# Patient Record
Sex: MALE | Race: WHITE | NOT HISPANIC OR LATINO
[De-identification: names, ages, dates, MRNs, and addresses within clinical notes are randomized per-mention and may not be internally consistent; named-entity substitution may affect disease eponyms.]

---

## 2020-03-30 PROBLEM — Z00.00 ENCOUNTER FOR PREVENTIVE HEALTH EXAMINATION: Status: ACTIVE | Noted: 2020-03-30

## 2020-03-31 ENCOUNTER — RECORD ABSTRACTING (OUTPATIENT)
Age: 75
End: 2020-03-31

## 2020-03-31 DIAGNOSIS — Z72.89 OTHER PROBLEMS RELATED TO LIFESTYLE: ICD-10-CM

## 2020-03-31 DIAGNOSIS — K62.5 HEMORRHAGE OF ANUS AND RECTUM: ICD-10-CM

## 2020-03-31 DIAGNOSIS — Z82.49 FAMILY HISTORY OF ISCHEMIC HEART DISEASE AND OTHER DISEASES OF THE CIRCULATORY SYSTEM: ICD-10-CM

## 2020-03-31 DIAGNOSIS — Z87.39 PERSONAL HISTORY OF OTHER DISEASES OF THE MUSCULOSKELETAL SYSTEM AND CONNECTIVE TISSUE: ICD-10-CM

## 2020-03-31 DIAGNOSIS — K80.20 CALCULUS OF GALLBLADDER W/OUT CHOLECYSTITIS W/OUT OBSTRUCTION: ICD-10-CM

## 2020-03-31 DIAGNOSIS — Z86.79 PERSONAL HISTORY OF OTHER DISEASES OF THE CIRCULATORY SYSTEM: ICD-10-CM

## 2020-03-31 DIAGNOSIS — N40.1 BENIGN PROSTATIC HYPERPLASIA WITH LOWER URINARY TRACT SYMPMS: ICD-10-CM

## 2020-03-31 DIAGNOSIS — Z86.69 PERSONAL HISTORY OF OTHER DISEASES OF THE NERVOUS SYSTEM AND SENSE ORGANS: ICD-10-CM

## 2020-03-31 DIAGNOSIS — Z87.438 PERSONAL HISTORY OF OTHER DISEASES OF MALE GENITAL ORGANS: ICD-10-CM

## 2020-03-31 RX ORDER — DIAZEPAM 5 MG/1
5 TABLET ORAL
Refills: 0 | Status: ACTIVE | COMMUNITY

## 2020-03-31 RX ORDER — OLMESARTAN MEDOXOMIL 20 MG/1
20 TABLET, FILM COATED ORAL DAILY
Refills: 0 | Status: ACTIVE | COMMUNITY

## 2020-03-31 RX ORDER — OMEGA-3/DHA/EPA/FISH OIL 300-1000MG
1000 CAPSULE ORAL
Refills: 0 | Status: ACTIVE | COMMUNITY

## 2020-03-31 RX ORDER — BIOTIN 10 MG
TABLET ORAL
Refills: 0 | Status: ACTIVE | COMMUNITY

## 2020-04-01 ENCOUNTER — APPOINTMENT (OUTPATIENT)
Dept: GASTROENTEROLOGY | Facility: CLINIC | Age: 75
End: 2020-04-01
Payer: MEDICARE

## 2020-04-01 ENCOUNTER — TRANSCRIPTION ENCOUNTER (OUTPATIENT)
Age: 75
End: 2020-04-01

## 2020-04-01 PROCEDURE — 99213 OFFICE O/P EST LOW 20 MIN: CPT | Mod: 95

## 2020-04-01 NOTE — PHYSICAL EXAM
[General Appearance - Alert] : alert [General Appearance - In No Acute Distress] : in no acute distress [Outer Ear] : the ears and nose were normal in appearance [Neck Appearance] : the appearance of the neck was normal [] : no respiratory distress [Oriented To Time, Place, And Person] : oriented to person, place, and time

## 2020-04-02 ENCOUNTER — TRANSCRIPTION ENCOUNTER (OUTPATIENT)
Age: 75
End: 2020-04-02

## 2020-04-03 ENCOUNTER — TRANSCRIPTION ENCOUNTER (OUTPATIENT)
Age: 75
End: 2020-04-03

## 2020-04-07 ENCOUNTER — APPOINTMENT (OUTPATIENT)
Dept: GASTROENTEROLOGY | Facility: CLINIC | Age: 75
End: 2020-04-07
Payer: MEDICARE

## 2020-04-07 DIAGNOSIS — R63.4 ABNORMAL WEIGHT LOSS: ICD-10-CM

## 2020-04-07 DIAGNOSIS — R14.0 ABDOMINAL DISTENSION (GASEOUS): ICD-10-CM

## 2020-04-07 PROCEDURE — 99214 OFFICE O/P EST MOD 30 MIN: CPT | Mod: 95

## 2020-04-07 NOTE — ASSESSMENT
[FreeTextEntry1] : Bloating / Weight loss:  Probable sequela of recent hospitalization for presumed enteritis. Improved at present. Essentially back to normal ( except weight) Diet as tolerated. Follow up as needed

## 2020-04-07 NOTE — HISTORY OF PRESENT ILLNESS
[Home] : at home, [unfilled] , at the time of the visit. [Medical Office: ([facility] [place])___] : at ~his/her~ medical office located in [place] [Patient] : the patient [Self] : self [de-identified] : Telehealth video visit: \par Currently feeling better . No bloating, nausea, vomiting, fever,  diarrhea, emesis, abdominal pain. Appetite and diet back to normal for past 48 hours. Has lost 10 pounds since hospitalization, but has just started to eat. Had bowel movement after 2 doses of  Miralax. Went to urgent care this past weekend with rapid heart rate.  Has h/o A. fib, but not in A. fib at urgent care. He has been in contact with his cardiologist and was started empirically on Flecanide Stopped Cosentyx for ankylosing spondylitis ( as a  possible contributor to his recent admission)\par \par Admitted to hospital 3/26  with 1 day crampy lower abdominal pain and nausea after eating " bad frozen chicken dinner". Ate alone. CT angiogram in hospital for pain reportedly out of proportion to physical exam  was notable for normal arteries , mild inflammation of mesenteric fat / non dilated fluid filled thickened small bowel loops in lower abdomen / some fluid around liver and spleen. A  small pancreatic cyst was also noted. At time of my and surgical exam in hospital patient was pain free. Complained of persistent nausea without emesis. Sxs eventually improved enough for him to tolerate a liquid diet and discharge. Patient was COVID negative in the hospital. Working diagnosis was enteritis / gastritis\par \par EGD / colonoscopy ( Dr. Juarez ..6/2017) was notable for diverticulosis only

## 2020-04-07 NOTE — DISCUSSION/SUMMARY
[FreeTextEntry1] : spoke to patient...still no bowel movements. No pain / nausea or vomiting....recommended Miralax daily or BID until bowel movement. Follow up as needed

## 2020-04-09 NOTE — HISTORY OF PRESENT ILLNESS
[Home] : at home, [unfilled] , at the time of the visit. [Medical Office: ([facility])___] : at ~his/her~ medical office located in [place] [Patient] : the patient [de-identified] : Telehealth video visit: unable to fully connect ( partially by phone / partially by video)\par Currently feeling better . No nausea, vomiting, fever,  diarrhea, emesis, abdominal pain. Still bloated and has yet to have a bowel movement. Appetite returning. Stopped Cosentyx for ankylosing spondylitis ( as a  possible contributor to his recent admission)\par \par Admitted to hospital 3/26  with 1 day crampy lower abdominal pain and nausea after eating " bad frozen chicken dinner". Ate alone. CT angiogram in hospital for pain reportedly out of proportion to physical exam  was notable for normal arteries , mild inflammation of mesenteric fat / non dilated fluid filled thickened small bowel loops in lower abdomen / some fluid around liver and spleen. A  small pancreatic cyst was also noted. At time of my and surgical exam in hospital patient was pain free. Complained of persistent nausea without emesis. Sxs eventually improved enough for him to tolerate a liquid diet and discharge. Patient was COVID negative in the hospital. Working diagnosis was enteritis / gastritis\par \par EGD / colonoscopy ( Dr. Juarez ..6/2017) was notable for diverticulosis only

## 2020-04-09 NOTE — ASSESSMENT
[FreeTextEntry1] : Bloating:  Probable sequela of recent hospitalization for presumed enteritis. Diet as tolerated. televideo f/u next week. Sooner if needed

## 2020-07-29 ENCOUNTER — RESULT REVIEW (OUTPATIENT)
Age: 75
End: 2020-07-29

## 2020-10-15 ENCOUNTER — RESULT REVIEW (OUTPATIENT)
Age: 75
End: 2020-10-15

## 2021-11-01 ENCOUNTER — RESULT REVIEW (OUTPATIENT)
Age: 76
End: 2021-11-01

## 2021-11-02 ENCOUNTER — RESULT REVIEW (OUTPATIENT)
Age: 76
End: 2021-11-02

## 2023-02-15 ENCOUNTER — NON-APPOINTMENT (OUTPATIENT)
Age: 78
End: 2023-02-15

## 2023-03-31 ENCOUNTER — APPOINTMENT (OUTPATIENT)
Dept: GASTROENTEROLOGY | Facility: CLINIC | Age: 78
End: 2023-03-31
Payer: MEDICARE

## 2023-03-31 VITALS
DIASTOLIC BLOOD PRESSURE: 82 MMHG | OXYGEN SATURATION: 98 % | BODY MASS INDEX: 24.8 KG/M2 | WEIGHT: 158 LBS | HEART RATE: 58 BPM | RESPIRATION RATE: 18 BRPM | SYSTOLIC BLOOD PRESSURE: 158 MMHG | HEIGHT: 67 IN

## 2023-03-31 DIAGNOSIS — K86.2 CYST OF PANCREAS: ICD-10-CM

## 2023-03-31 PROCEDURE — 99214 OFFICE O/P EST MOD 30 MIN: CPT

## 2023-03-31 RX ORDER — SENNOSIDES 8.6 MG
TABLET ORAL DAILY
Refills: 0 | Status: DISCONTINUED | COMMUNITY
End: 2023-03-31

## 2023-03-31 RX ORDER — NEBIVOLOL HYDROCHLORIDE 10 MG/1
10 TABLET ORAL DAILY
Refills: 0 | Status: DISCONTINUED | COMMUNITY
End: 2023-03-31

## 2023-03-31 RX ORDER — LOVASTATIN 20 MG/1
20 TABLET ORAL
Refills: 0 | Status: DISCONTINUED | COMMUNITY
End: 2023-03-31

## 2023-03-31 RX ORDER — INDOMETHACIN 75 MG/1
75 CAPSULE, EXTENDED RELEASE ORAL
Refills: 0 | Status: DISCONTINUED | COMMUNITY
End: 2023-03-31

## 2023-03-31 NOTE — ASSESSMENT
[FreeTextEntry1] : 1.  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  Once this is cleared we can continue on his current regimen of follow up with yearly mri exams...at least for the time being\par \par although the standard for the next steop ie EUS or endoscopic ultrasound, vary a bit, in general people look for enlarging cysts over time, or at least one of the cysts exceeding 2 cm..\par \par therefore, i advise that Beto return to see me in one year, and then we can arrange the MRI/ MRCP exam..\par \par as for colonoscopy, a seven year follow up exam is advised so we can discuss that at the same time, with patients last colonoscopy june 28th, 2017.\par \par no need for a rectal exam today, as his very COnscientious primary care doctor, in this case dr Carr, did one last week..\par \par AS WE OBTAIN INFORMED CONSENT FOR COLONOSCOPY, UPPER ENDOSCOPY [EGD], OR BOTH PROCEDURES TOGETHER;\par \par As with all procedures, there are risks of which the patient should be aware\par \par 1.  Anesthesia; deep sedation with Propofol;  there is a small risk of aspiration and pulmonary infection.  The Anesthesiologist meets with the patient the morning of the procedure to discuss in more detail\par \par 2.  risk of bleeding; from removal of a polyp, or rarely, from biopsies, 1 % or less\par \par 3.  risk of injury or perforation of the colon or upper GI tract; one in a thousand or less,  from removing a polyp or from advancing the instrument\par \par \par

## 2023-03-31 NOTE — HISTORY OF PRESENT ILLNESS
[FreeTextEntry1] : this is a [age] y o gentleman, who presents for re evalluation because of pancreatic cysts, probably due to IPMN or INtrapapillary Mucinous Cyst Adenoma\par \par this is a primary ductal abnormality of the pancreatic duct system, major and sometimes minor brancehes of the duct system, and sometimes it is associated with development of pancreati cysts\par \par There has been no progression over several imaging studies including 2020 October and then February or March 2022 and now February 24, 2023.  Every 28 there are no "worrisome features "according to the radiologist who interpreted this.\par The patient has no abdominal pain no weight loss he feels well does not use alcohol and he is otherwise in good health.  He does have paroxysmal atrial fibrillation and he takes Xarelto

## 2023-04-24 ENCOUNTER — TRANSCRIPTION ENCOUNTER (OUTPATIENT)
Age: 78
End: 2023-04-24

## 2023-04-27 ENCOUNTER — TRANSCRIPTION ENCOUNTER (OUTPATIENT)
Age: 78
End: 2023-04-27

## 2023-06-06 ENCOUNTER — TRANSCRIPTION ENCOUNTER (OUTPATIENT)
Age: 78
End: 2023-06-06

## 2024-06-05 ENCOUNTER — APPOINTMENT (OUTPATIENT)
Dept: GASTROENTEROLOGY | Facility: CLINIC | Age: 79
End: 2024-06-05
Payer: MEDICARE

## 2024-06-05 VITALS
HEIGHT: 67 IN | DIASTOLIC BLOOD PRESSURE: 70 MMHG | SYSTOLIC BLOOD PRESSURE: 130 MMHG | WEIGHT: 158 LBS | BODY MASS INDEX: 24.8 KG/M2

## 2024-06-05 DIAGNOSIS — D49.0 NEOPLASM OF UNSPECIFIED BEHAVIOR OF DIGESTIVE SYSTEM: ICD-10-CM

## 2024-06-05 DIAGNOSIS — Z12.11 ENCOUNTER FOR SCREENING FOR MALIGNANT NEOPLASM OF COLON: ICD-10-CM

## 2024-06-05 PROCEDURE — 99213 OFFICE O/P EST LOW 20 MIN: CPT

## 2024-06-05 PROCEDURE — 99203 OFFICE O/P NEW LOW 30 MIN: CPT

## 2024-06-05 NOTE — ASSESSMENT
[FreeTextEntry1] : we had a long discussion re possible colonoscopy as of seven years vs other colon cancer screening  options  decided to do GFIT test which is being sent off rectal neg stool guaiac neg

## 2024-06-05 NOTE — PHYSICAL EXAM
[Normal Sphincter Tone] : normal sphincter tone [No External Hemorrhoid] : no external hemorrhoids [No Rectal Mass] : no rectal mass [Occult Blood] : negative occult blood [Chaperone Present: ____] : chaperone present: [unfilled] [FIT Test] : positive FIT test

## 2024-12-25 PROBLEM — F10.90 ALCOHOL USE: Status: ACTIVE | Noted: 2020-03-31

## 2025-01-24 ENCOUNTER — NON-APPOINTMENT (OUTPATIENT)
Age: 80
End: 2025-01-24

## 2025-04-19 ENCOUNTER — NON-APPOINTMENT (OUTPATIENT)
Age: 80
End: 2025-04-19

## 2025-06-24 ENCOUNTER — NON-APPOINTMENT (OUTPATIENT)
Age: 80
End: 2025-06-24

## 2025-06-25 ENCOUNTER — APPOINTMENT (OUTPATIENT)
Dept: GASTROENTEROLOGY | Facility: CLINIC | Age: 80
End: 2025-06-25
Payer: MEDICARE

## 2025-06-25 PROCEDURE — 99214 OFFICE O/P EST MOD 30 MIN: CPT | Mod: 2W

## 2025-06-25 RX ORDER — SODIUM PICOSULFATE, MAGNESIUM OXIDE, AND ANHYDROUS CITRIC ACID 12; 3.5; 1 G/175ML; G/175ML; MG/175ML
10-3.5-12 MG-GM LIQUID ORAL
Qty: 2 | Refills: 1 | Status: ACTIVE | COMMUNITY
Start: 2025-06-25 | End: 1900-01-01

## 2025-07-03 ENCOUNTER — APPOINTMENT (OUTPATIENT)
Dept: GASTROENTEROLOGY | Facility: HOSPITAL | Age: 80
End: 2025-07-03

## 2025-07-04 ENCOUNTER — TRANSCRIPTION ENCOUNTER (OUTPATIENT)
Age: 80
End: 2025-07-04

## 2025-07-17 ENCOUNTER — APPOINTMENT (OUTPATIENT)
Dept: NEPHROLOGY | Facility: CLINIC | Age: 80
End: 2025-07-17
Payer: MEDICARE

## 2025-07-17 VITALS
OXYGEN SATURATION: 98 % | HEART RATE: 63 BPM | DIASTOLIC BLOOD PRESSURE: 80 MMHG | HEIGHT: 67 IN | SYSTOLIC BLOOD PRESSURE: 150 MMHG | BODY MASS INDEX: 23.23 KG/M2 | WEIGHT: 148 LBS

## 2025-07-17 PROBLEM — I10 HYPERTENSION, ESSENTIAL: Status: ACTIVE | Noted: 2025-06-25

## 2025-07-17 PROBLEM — N20.0 CALCIUM OXALATE KIDNEY STONES: Status: ACTIVE | Noted: 2025-07-17

## 2025-07-17 PROBLEM — N20.0 NEPHROLITHIASIS: Status: ACTIVE | Noted: 2025-07-17

## 2025-07-17 PROBLEM — N18.31 STAGE 3A CHRONIC KIDNEY DISEASE: Status: ACTIVE | Noted: 2025-07-17

## 2025-07-17 PROBLEM — I48.0 PAROXYSMAL ATRIAL FIBRILLATION: Status: ACTIVE | Noted: 2025-06-25

## 2025-07-17 PROCEDURE — 99204 OFFICE O/P NEW MOD 45 MIN: CPT

## 2025-07-17 RX ORDER — LABETALOL HYDROCHLORIDE 100 MG/1
100 TABLET, FILM COATED ORAL
Refills: 0 | Status: ACTIVE | COMMUNITY

## 2025-07-17 RX ORDER — ROSUVASTATIN CALCIUM 20 MG/1
20 TABLET, FILM COATED ORAL DAILY
Refills: 0 | Status: ACTIVE | COMMUNITY

## 2025-07-17 RX ORDER — RIVAROXABAN 20 MG/1
20 TABLET, FILM COATED ORAL AT BEDTIME
Refills: 0 | Status: ACTIVE | COMMUNITY

## 2025-07-17 RX ORDER — SECUKINUMAB 150 MG/ML
150 INJECTION SUBCUTANEOUS
Refills: 0 | Status: ACTIVE | COMMUNITY

## 2025-07-17 RX ORDER — FLECAINIDE ACETATE 50 MG/1
50 TABLET ORAL
Refills: 0 | Status: ACTIVE | COMMUNITY

## 2025-07-17 RX ORDER — SILDENAFIL 25 MG/1
25 TABLET ORAL
Refills: 0 | Status: ACTIVE | COMMUNITY

## 2025-07-17 RX ORDER — VALACYCLOVIR 1 G/1
1 TABLET, FILM COATED ORAL DAILY
Refills: 0 | Status: ACTIVE | COMMUNITY

## 2025-07-17 RX ORDER — EZETIMIBE 10 MG/1
10 TABLET ORAL DAILY
Refills: 0 | Status: ACTIVE | COMMUNITY

## 2025-07-17 RX ORDER — LOSARTAN POTASSIUM AND HYDROCHLOROTHIAZIDE 12.5; 5 MG/1; MG/1
50-12.5 TABLET ORAL DAILY
Refills: 0 | Status: ACTIVE | COMMUNITY

## 2025-09-19 ENCOUNTER — TRANSCRIPTION ENCOUNTER (OUTPATIENT)
Age: 80
End: 2025-09-19